# Patient Record
Sex: MALE | Race: OTHER | Employment: OTHER | URBAN - METROPOLITAN AREA
[De-identification: names, ages, dates, MRNs, and addresses within clinical notes are randomized per-mention and may not be internally consistent; named-entity substitution may affect disease eponyms.]

---

## 2023-09-03 ENCOUNTER — APPOINTMENT (OUTPATIENT)
Dept: GENERAL RADIOLOGY | Facility: HOSPITAL | Age: 53
End: 2023-09-03
Attending: EMERGENCY MEDICINE
Payer: MEDICARE

## 2023-09-03 ENCOUNTER — HOSPITAL ENCOUNTER (INPATIENT)
Facility: HOSPITAL | Age: 53
LOS: 4 days | Discharge: NURSING, REHAB OR LONG-TERM CARE | End: 2023-09-07
Attending: EMERGENCY MEDICINE | Admitting: INTERNAL MEDICINE
Payer: MEDICARE

## 2023-09-03 ENCOUNTER — ANESTHESIA (OUTPATIENT)
Dept: SURGERY | Facility: HOSPITAL | Age: 53
End: 2023-09-03
Payer: MEDICARE

## 2023-09-03 ENCOUNTER — HOSPITAL ENCOUNTER (INPATIENT)
Facility: HOSPITAL | Age: 53
LOS: 4 days | Discharge: HOME | End: 2023-09-07
Attending: EMERGENCY MEDICINE | Admitting: INTERNAL MEDICINE
Payer: MEDICARE

## 2023-09-03 ENCOUNTER — ANESTHESIA EVENT (OUTPATIENT)
Dept: SURGERY | Facility: HOSPITAL | Age: 53
End: 2023-09-03
Payer: MEDICARE

## 2023-09-03 DIAGNOSIS — L08.9 LACERATION OF RIGHT HAND WITH INFECTION, INITIAL ENCOUNTER: Primary | ICD-10-CM

## 2023-09-03 DIAGNOSIS — L08.9 INFECTED ABRASION OF FINGER OF RIGHT HAND, INITIAL ENCOUNTER: ICD-10-CM

## 2023-09-03 DIAGNOSIS — L08.9 INFECTED ABRASION OF FINGER OF RIGHT HAND, INITIAL ENCOUNTER: Primary | ICD-10-CM

## 2023-09-03 DIAGNOSIS — S60.419A INFECTED ABRASION OF FINGER OF RIGHT HAND, INITIAL ENCOUNTER: ICD-10-CM

## 2023-09-03 DIAGNOSIS — S61.411A LACERATION OF RIGHT HAND WITH INFECTION, INITIAL ENCOUNTER: Primary | ICD-10-CM

## 2023-09-03 DIAGNOSIS — S60.419A INFECTED ABRASION OF FINGER OF RIGHT HAND, INITIAL ENCOUNTER: Primary | ICD-10-CM

## 2023-09-03 LAB
ALBUMIN SERPL-MCNC: 4 G/DL (ref 3.4–5)
ALBUMIN/GLOB SERPL: 1.3 {RATIO} (ref 1–2)
ALP LIVER SERPL-CCNC: 49 U/L
ALT SERPL-CCNC: 23 U/L
ANION GAP SERPL CALC-SCNC: 5 MMOL/L (ref 0–18)
AST SERPL-CCNC: 22 U/L (ref 15–37)
BASOPHILS # BLD AUTO: 0.04 X10(3) UL (ref 0–0.2)
BASOPHILS NFR BLD AUTO: 0.4 %
BILIRUB SERPL-MCNC: 1.1 MG/DL (ref 0.1–2)
BUN BLD-MCNC: 10 MG/DL (ref 7–18)
CALCIUM BLD-MCNC: 9.1 MG/DL (ref 8.5–10.1)
CHLORIDE SERPL-SCNC: 107 MMOL/L (ref 98–112)
CO2 SERPL-SCNC: 28 MMOL/L (ref 21–32)
CREAT BLD-MCNC: 1.03 MG/DL
EGFRCR SERPLBLD CKD-EPI 2021: 88 ML/MIN/1.73M2 (ref 60–?)
EOSINOPHIL # BLD AUTO: 0.02 X10(3) UL (ref 0–0.7)
EOSINOPHIL NFR BLD AUTO: 0.2 %
ERYTHROCYTE [DISTWIDTH] IN BLOOD BY AUTOMATED COUNT: 13.3 %
GLOBULIN PLAS-MCNC: 3.2 G/DL (ref 2.8–4.4)
GLUCOSE BLD-MCNC: 103 MG/DL (ref 70–99)
HCT VFR BLD AUTO: 41.9 %
HGB BLD-MCNC: 14 G/DL
IMM GRANULOCYTES # BLD AUTO: 0.04 X10(3) UL (ref 0–1)
IMM GRANULOCYTES NFR BLD: 0.4 %
LACTATE SERPL-SCNC: 0.9 MMOL/L (ref 0.4–2)
LYMPHOCYTES # BLD AUTO: 0.86 X10(3) UL (ref 1–4)
LYMPHOCYTES NFR BLD AUTO: 8.1 %
MCH RBC QN AUTO: 28.7 PG (ref 26–34)
MCHC RBC AUTO-ENTMCNC: 33.4 G/DL (ref 31–37)
MCV RBC AUTO: 85.9 FL
MONOCYTES # BLD AUTO: 0.89 X10(3) UL (ref 0.1–1)
MONOCYTES NFR BLD AUTO: 8.3 %
NEUTROPHILS # BLD AUTO: 8.83 X10 (3) UL (ref 1.5–7.7)
NEUTROPHILS # BLD AUTO: 8.83 X10(3) UL (ref 1.5–7.7)
NEUTROPHILS NFR BLD AUTO: 82.6 %
OSMOLALITY SERPL CALC.SUM OF ELEC: 289 MOSM/KG (ref 275–295)
PLATELET # BLD AUTO: 196 10(3)UL (ref 150–450)
POTASSIUM SERPL-SCNC: 3.7 MMOL/L (ref 3.5–5.1)
PROT SERPL-MCNC: 7.2 G/DL (ref 6.4–8.2)
RBC # BLD AUTO: 4.88 X10(6)UL
SODIUM SERPL-SCNC: 140 MMOL/L (ref 136–145)
WBC # BLD AUTO: 10.7 X10(3) UL (ref 4–11)

## 2023-09-03 PROCEDURE — 99223 1ST HOSP IP/OBS HIGH 75: CPT | Performed by: INTERNAL MEDICINE

## 2023-09-03 PROCEDURE — 0L970ZZ DRAINAGE OF RIGHT HAND TENDON, OPEN APPROACH: ICD-10-PCS | Performed by: ORTHOPAEDIC SURGERY

## 2023-09-03 PROCEDURE — 73130 X-RAY EXAM OF HAND: CPT | Performed by: EMERGENCY MEDICINE

## 2023-09-03 RX ORDER — DIPHENHYDRAMINE HYDROCHLORIDE 50 MG/ML
12.5 INJECTION INTRAMUSCULAR; INTRAVENOUS AS NEEDED
Status: CANCELLED | OUTPATIENT
Start: 2023-09-03 | End: 2023-09-04

## 2023-09-03 RX ORDER — ONDANSETRON 2 MG/ML
INJECTION INTRAMUSCULAR; INTRAVENOUS AS NEEDED
Status: DISCONTINUED | OUTPATIENT
Start: 2023-09-03 | End: 2023-09-03 | Stop reason: SURG

## 2023-09-03 RX ORDER — SENNOSIDES 8.6 MG
17.2 TABLET ORAL NIGHTLY PRN
Status: DISCONTINUED | OUTPATIENT
Start: 2023-09-03 | End: 2023-09-07

## 2023-09-03 RX ORDER — CEFAZOLIN SODIUM/WATER 2 G/20 ML
2 SYRINGE (ML) INTRAVENOUS ONCE
Status: COMPLETED | OUTPATIENT
Start: 2023-09-03 | End: 2023-09-03

## 2023-09-03 RX ORDER — ACETAMINOPHEN 500 MG
1000 TABLET ORAL ONCE AS NEEDED
Status: DISCONTINUED | OUTPATIENT
Start: 2023-09-03 | End: 2023-09-03 | Stop reason: HOSPADM

## 2023-09-03 RX ORDER — NALOXONE HYDROCHLORIDE 0.4 MG/ML
80 INJECTION, SOLUTION INTRAMUSCULAR; INTRAVENOUS; SUBCUTANEOUS AS NEEDED
Status: DISCONTINUED | OUTPATIENT
Start: 2023-09-03 | End: 2023-09-03 | Stop reason: HOSPADM

## 2023-09-03 RX ORDER — MORPHINE SULFATE 4 MG/ML
4 INJECTION, SOLUTION INTRAMUSCULAR; INTRAVENOUS EVERY 2 HOUR PRN
Status: DISCONTINUED | OUTPATIENT
Start: 2023-09-03 | End: 2023-09-07

## 2023-09-03 RX ORDER — POLYETHYLENE GLYCOL 3350 17 G/17G
17 POWDER, FOR SOLUTION ORAL DAILY PRN
Status: DISCONTINUED | OUTPATIENT
Start: 2023-09-03 | End: 2023-09-07

## 2023-09-03 RX ORDER — SODIUM CHLORIDE 9 MG/ML
INJECTION, SOLUTION INTRAVENOUS CONTINUOUS
Status: ACTIVE | OUTPATIENT
Start: 2023-09-03 | End: 2023-09-03

## 2023-09-03 RX ORDER — LIDOCAINE HYDROCHLORIDE 10 MG/ML
INJECTION, SOLUTION EPIDURAL; INFILTRATION; INTRACAUDAL; PERINEURAL AS NEEDED
Status: DISCONTINUED | OUTPATIENT
Start: 2023-09-03 | End: 2023-09-03 | Stop reason: SURG

## 2023-09-03 RX ORDER — HYDROCODONE BITARTRATE AND ACETAMINOPHEN 5; 325 MG/1; MG/1
2 TABLET ORAL ONCE AS NEEDED
Status: CANCELLED | OUTPATIENT
Start: 2023-09-03 | End: 2023-09-03

## 2023-09-03 RX ORDER — HYDRALAZINE HYDROCHLORIDE 20 MG/ML
10 INJECTION INTRAMUSCULAR; INTRAVENOUS EVERY 6 HOURS PRN
Status: DISCONTINUED | OUTPATIENT
Start: 2023-09-03 | End: 2023-09-07

## 2023-09-03 RX ORDER — MIDAZOLAM HYDROCHLORIDE 1 MG/ML
1 INJECTION INTRAMUSCULAR; INTRAVENOUS EVERY 5 MIN PRN
Status: CANCELLED | OUTPATIENT
Start: 2023-09-03 | End: 2023-09-04

## 2023-09-03 RX ORDER — CEFAZOLIN SODIUM/WATER 2 G/20 ML
2 SYRINGE (ML) INTRAVENOUS EVERY 8 HOURS
Status: DISCONTINUED | OUTPATIENT
Start: 2023-09-03 | End: 2023-09-07

## 2023-09-03 RX ORDER — HYDROCODONE BITARTRATE AND ACETAMINOPHEN 5; 325 MG/1; MG/1
2 TABLET ORAL ONCE AS NEEDED
Status: DISCONTINUED | OUTPATIENT
Start: 2023-09-03 | End: 2023-09-03 | Stop reason: HOSPADM

## 2023-09-03 RX ORDER — BISACODYL 10 MG
10 SUPPOSITORY, RECTAL RECTAL
Status: DISCONTINUED | OUTPATIENT
Start: 2023-09-03 | End: 2023-09-07

## 2023-09-03 RX ORDER — HYDROMORPHONE HYDROCHLORIDE 1 MG/ML
0.2 INJECTION, SOLUTION INTRAMUSCULAR; INTRAVENOUS; SUBCUTANEOUS EVERY 5 MIN PRN
Status: DISCONTINUED | OUTPATIENT
Start: 2023-09-03 | End: 2023-09-03 | Stop reason: HOSPADM

## 2023-09-03 RX ORDER — SODIUM CHLORIDE, SODIUM LACTATE, POTASSIUM CHLORIDE, CALCIUM CHLORIDE 600; 310; 30; 20 MG/100ML; MG/100ML; MG/100ML; MG/100ML
INJECTION, SOLUTION INTRAVENOUS CONTINUOUS
Status: DISCONTINUED | OUTPATIENT
Start: 2023-09-03 | End: 2023-09-03 | Stop reason: HOSPADM

## 2023-09-03 RX ORDER — HYDROCODONE BITARTRATE AND ACETAMINOPHEN 5; 325 MG/1; MG/1
1 TABLET ORAL ONCE AS NEEDED
Status: CANCELLED | OUTPATIENT
Start: 2023-09-03 | End: 2023-09-03

## 2023-09-03 RX ORDER — HYDROMORPHONE HYDROCHLORIDE 1 MG/ML
0.4 INJECTION, SOLUTION INTRAMUSCULAR; INTRAVENOUS; SUBCUTANEOUS EVERY 5 MIN PRN
Status: DISCONTINUED | OUTPATIENT
Start: 2023-09-03 | End: 2023-09-03 | Stop reason: HOSPADM

## 2023-09-03 RX ORDER — HYDROMORPHONE HYDROCHLORIDE 1 MG/ML
0.4 INJECTION, SOLUTION INTRAMUSCULAR; INTRAVENOUS; SUBCUTANEOUS EVERY 5 MIN PRN
Status: CANCELLED | OUTPATIENT
Start: 2023-09-03 | End: 2023-09-04

## 2023-09-03 RX ORDER — MORPHINE SULFATE 4 MG/ML
4 INJECTION, SOLUTION INTRAMUSCULAR; INTRAVENOUS EVERY 30 MIN PRN
Status: DISCONTINUED | OUTPATIENT
Start: 2023-09-03 | End: 2023-09-03

## 2023-09-03 RX ORDER — HYDROCODONE BITARTRATE AND ACETAMINOPHEN 5; 325 MG/1; MG/1
1 TABLET ORAL ONCE AS NEEDED
Status: DISCONTINUED | OUTPATIENT
Start: 2023-09-03 | End: 2023-09-03 | Stop reason: HOSPADM

## 2023-09-03 RX ORDER — ACETAMINOPHEN 500 MG
1000 TABLET ORAL ONCE
Status: COMPLETED | OUTPATIENT
Start: 2023-09-03 | End: 2023-09-03

## 2023-09-03 RX ORDER — HYDROCODONE BITARTRATE AND ACETAMINOPHEN 5; 325 MG/1; MG/1
1 TABLET ORAL EVERY 4 HOURS PRN
Status: DISCONTINUED | OUTPATIENT
Start: 2023-09-03 | End: 2023-09-07

## 2023-09-03 RX ORDER — DEXAMETHASONE SODIUM PHOSPHATE 4 MG/ML
VIAL (ML) INJECTION AS NEEDED
Status: DISCONTINUED | OUTPATIENT
Start: 2023-09-03 | End: 2023-09-03 | Stop reason: SURG

## 2023-09-03 RX ORDER — BENZONATATE 100 MG/1
200 CAPSULE ORAL 3 TIMES DAILY PRN
Status: DISCONTINUED | OUTPATIENT
Start: 2023-09-03 | End: 2023-09-07

## 2023-09-03 RX ORDER — NALOXONE HYDROCHLORIDE 0.4 MG/ML
80 INJECTION, SOLUTION INTRAMUSCULAR; INTRAVENOUS; SUBCUTANEOUS AS NEEDED
Status: CANCELLED | OUTPATIENT
Start: 2023-09-03 | End: 2023-09-04

## 2023-09-03 RX ORDER — ENOXAPARIN SODIUM 100 MG/ML
40 INJECTION SUBCUTANEOUS NIGHTLY
Status: DISCONTINUED | OUTPATIENT
Start: 2023-09-03 | End: 2023-09-07

## 2023-09-03 RX ORDER — MELATONIN
3 NIGHTLY PRN
Status: DISCONTINUED | OUTPATIENT
Start: 2023-09-03 | End: 2023-09-07

## 2023-09-03 RX ORDER — ONDANSETRON 2 MG/ML
4 INJECTION INTRAMUSCULAR; INTRAVENOUS EVERY 6 HOURS PRN
Status: CANCELLED | OUTPATIENT
Start: 2023-09-03

## 2023-09-03 RX ORDER — MIDAZOLAM HYDROCHLORIDE 1 MG/ML
1 INJECTION INTRAMUSCULAR; INTRAVENOUS EVERY 5 MIN PRN
Status: DISCONTINUED | OUTPATIENT
Start: 2023-09-03 | End: 2023-09-03 | Stop reason: HOSPADM

## 2023-09-03 RX ORDER — ACETAMINOPHEN 325 MG/1
650 TABLET ORAL EVERY 4 HOURS PRN
Status: DISCONTINUED | OUTPATIENT
Start: 2023-09-03 | End: 2023-09-07

## 2023-09-03 RX ORDER — MORPHINE SULFATE 2 MG/ML
2 INJECTION, SOLUTION INTRAMUSCULAR; INTRAVENOUS EVERY 2 HOUR PRN
Status: DISCONTINUED | OUTPATIENT
Start: 2023-09-03 | End: 2023-09-07

## 2023-09-03 RX ORDER — HYDROMORPHONE HYDROCHLORIDE 1 MG/ML
0.2 INJECTION, SOLUTION INTRAMUSCULAR; INTRAVENOUS; SUBCUTANEOUS EVERY 5 MIN PRN
Status: CANCELLED | OUTPATIENT
Start: 2023-09-03 | End: 2023-09-04

## 2023-09-03 RX ORDER — ONDANSETRON 2 MG/ML
4 INJECTION INTRAMUSCULAR; INTRAVENOUS EVERY 6 HOURS PRN
Status: DISCONTINUED | OUTPATIENT
Start: 2023-09-03 | End: 2023-09-07

## 2023-09-03 RX ORDER — ONDANSETRON 2 MG/ML
4 INJECTION INTRAMUSCULAR; INTRAVENOUS EVERY 6 HOURS PRN
Status: DISCONTINUED | OUTPATIENT
Start: 2023-09-03 | End: 2023-09-03 | Stop reason: HOSPADM

## 2023-09-03 RX ORDER — ECHINACEA PURPUREA EXTRACT 125 MG
1 TABLET ORAL
Status: DISCONTINUED | OUTPATIENT
Start: 2023-09-03 | End: 2023-09-07

## 2023-09-03 RX ORDER — PROCHLORPERAZINE EDISYLATE 5 MG/ML
5 INJECTION INTRAMUSCULAR; INTRAVENOUS EVERY 8 HOURS PRN
Status: DISCONTINUED | OUTPATIENT
Start: 2023-09-03 | End: 2023-09-07

## 2023-09-03 RX ORDER — MORPHINE SULFATE 2 MG/ML
1 INJECTION, SOLUTION INTRAMUSCULAR; INTRAVENOUS EVERY 2 HOUR PRN
Status: DISCONTINUED | OUTPATIENT
Start: 2023-09-03 | End: 2023-09-07

## 2023-09-03 RX ORDER — HYDROMORPHONE HYDROCHLORIDE 1 MG/ML
0.6 INJECTION, SOLUTION INTRAMUSCULAR; INTRAVENOUS; SUBCUTANEOUS EVERY 5 MIN PRN
Status: CANCELLED | OUTPATIENT
Start: 2023-09-03 | End: 2023-09-04

## 2023-09-03 RX ORDER — SODIUM CHLORIDE, SODIUM LACTATE, POTASSIUM CHLORIDE, CALCIUM CHLORIDE 600; 310; 30; 20 MG/100ML; MG/100ML; MG/100ML; MG/100ML
INJECTION, SOLUTION INTRAVENOUS CONTINUOUS
Status: CANCELLED | OUTPATIENT
Start: 2023-09-03

## 2023-09-03 RX ORDER — HYDROCODONE BITARTRATE AND ACETAMINOPHEN 5; 325 MG/1; MG/1
2 TABLET ORAL EVERY 4 HOURS PRN
Status: DISCONTINUED | OUTPATIENT
Start: 2023-09-03 | End: 2023-09-07

## 2023-09-03 RX ORDER — HYDROMORPHONE HYDROCHLORIDE 1 MG/ML
0.6 INJECTION, SOLUTION INTRAMUSCULAR; INTRAVENOUS; SUBCUTANEOUS EVERY 5 MIN PRN
Status: DISCONTINUED | OUTPATIENT
Start: 2023-09-03 | End: 2023-09-03 | Stop reason: HOSPADM

## 2023-09-03 RX ORDER — ENEMA 19; 7 G/133ML; G/133ML
1 ENEMA RECTAL ONCE AS NEEDED
Status: DISCONTINUED | OUTPATIENT
Start: 2023-09-03 | End: 2023-09-07

## 2023-09-03 RX ORDER — MEPERIDINE HYDROCHLORIDE 25 MG/ML
12.5 INJECTION INTRAMUSCULAR; INTRAVENOUS; SUBCUTANEOUS AS NEEDED
Status: DISCONTINUED | OUTPATIENT
Start: 2023-09-03 | End: 2023-09-03 | Stop reason: HOSPADM

## 2023-09-03 RX ORDER — ACETAMINOPHEN 500 MG
1000 TABLET ORAL ONCE AS NEEDED
Status: CANCELLED | OUTPATIENT
Start: 2023-09-03 | End: 2023-09-03

## 2023-09-03 RX ORDER — DIPHENHYDRAMINE HYDROCHLORIDE 50 MG/ML
12.5 INJECTION INTRAMUSCULAR; INTRAVENOUS AS NEEDED
Status: DISCONTINUED | OUTPATIENT
Start: 2023-09-03 | End: 2023-09-03 | Stop reason: HOSPADM

## 2023-09-03 RX ORDER — MEPERIDINE HYDROCHLORIDE 25 MG/ML
12.5 INJECTION INTRAMUSCULAR; INTRAVENOUS; SUBCUTANEOUS AS NEEDED
Status: CANCELLED | OUTPATIENT
Start: 2023-09-03

## 2023-09-03 RX ADMIN — ONDANSETRON 4 MG: 2 INJECTION INTRAMUSCULAR; INTRAVENOUS at 19:54:00

## 2023-09-03 RX ADMIN — DEXAMETHASONE SODIUM PHOSPHATE 4 MG: 4 MG/ML VIAL (ML) INJECTION at 19:54:00

## 2023-09-03 RX ADMIN — LIDOCAINE HYDROCHLORIDE 50 MG: 10 INJECTION, SOLUTION EPIDURAL; INFILTRATION; INTRACAUDAL; PERINEURAL at 19:48:00

## 2023-09-03 RX ADMIN — CEFAZOLIN SODIUM/WATER 2 G: 2 G/20 ML SYRINGE (ML) INTRAVENOUS at 19:54:00

## 2023-09-03 NOTE — CONSULTS
[address]    PATIENT'S NAME: Kristen Linton   ATTENDING PHYSICIAN: Latha Lux DO   CONSULTING PHYSICIAN: Liana Hdez M.D. PATIENT ACCOUNT#:   [de-identified]    LOCATION:  [address] [place] [address]  MEDICAL RECORD #:   [id number]       YOB: 1970  ADMISSION DATE:       09/03/2023      CONSULT DATE:  09/03/2023    REPORT OF CONSULTATION    REASON FOR CONSULTATION:  From Latha Morillo DO, regarding right hand laceration. HISTORY OF PRESENT ILLNESS:  This is a 53-year-old male who presents with increasing pain and swelling at the right hand, primarily index finger. He lacerated the palmar base of his index finger while cleaning a knife 2 days ago. He was not evaluated at that time. He now presents with worsening pain and inability to flex and extend the finger. He denies numbness or tingling and no other complaints. PAST MEDICAL HISTORY:  Hypertension, [place] disease. MEDICATIONS:  See list in the chart. ALLERGIES:  None. FAMILY HISTORY:  Charted and reviewed. SOCIAL HISTORY:  Charted and reviewed. REVIEW OF SYSTEMS:  Charted and reviewed. PHYSICAL EXAMINATION:    GENERAL:  Alert male resting in bed, in no acute distress. VITAL SIGNS:  Temp at the time of admission was 100.7. EXTREMITY:  Exam of the right upper extremity:  There is a transverse laceration at the palmar base of the index finger in line with the crease with dried blood and no active drainage. There is diffuse swelling throughout the index finger primarily but also the radial aspect of the hand dorsally and palmarly with erythema throughout the palmar region. Passive range of motion is with significant pain to attempted flexion and passive extension. Tenderness to palpation along the palmar aspect of the index finger and the radial palm without fluctuance. Neurovascularly intact at the index finger.       IMAGING:  Three-view x-rays of the right hand are unremarkable at the area of the index finger and palm. IMPRESSION:  Right index finger laceration, rule out flexor tenosynovitis. PLAN:  I discussed the possible diagnosis with the patient. IV antibiotics are started. I discussed the importance of strict elevation of the hand to help the swelling. I will discuss the patient with my partners who specialize in hand surgery to plan for further evaluation and treatment. The patient will be n.p.o. until I communicate with my partners. Thank you for the consultation.     Dictated By Sangeeta Perez M.D.  d: 09/03/2023 09:58:23  t: 09/03/2023 10:33:37  [facility] [id number]  /

## 2023-09-03 NOTE — ED QUICK NOTES
Orders for admission, patient is aware of plan and ready to go upstairs. Any questions, please call ED RN Alla Mcnamara  at extension [phone]. Vaccinated?    Type of COVID test sent:  COVID Suspicion level: None      Titratable drug(s) infusing: N/A  Rate: N/A    LOC at time of transport: A/Ox4    Other pertinent information: N/A     CIWA score= N/A  NIH score= N/A

## 2023-09-03 NOTE — PROGRESS NOTES
Ortho hand    Pt seen and examined. The finger does have signs of flexor tenosynovitis. Recommend surgery for incision and drainage. Risk/benefit d/w patient inc stiffness, pain, chronic infection. He shows good understanding and wishes to proceed. To OR tonight.     Enoch Amin MD  [address] [place] [address] Rd

## 2023-09-03 NOTE — ED INITIAL ASSESSMENT (HPI)
Pt states he cut his right hand with a kitchen knife Friday afternoon. Today pt noticed redness and swelling to hand. Pt c/o pain to area.

## 2023-09-04 PROCEDURE — 99232 SBSQ HOSP IP/OBS MODERATE 35: CPT | Performed by: INTERNAL MEDICINE

## 2023-09-04 RX ORDER — AMLODIPINE BESYLATE 5 MG/1
5 TABLET ORAL DAILY
Status: DISCONTINUED | OUTPATIENT
Start: 2023-09-04 | End: 2023-09-07

## 2023-09-04 NOTE — OPERATIVE REPORT
[address]    PATIENT'S NAME: Denise Morse   ATTENDING PHYSICIAN: Latha Gallardo DO   OPERATING PHYSICIAN: Negin Cruz M.D. PATIENT ACCOUNT#:   [de-identified]    LOCATION:  PACU [facility] PACU 2 [facility] 10  MEDICAL RECORD #:   [id number]       YOB: 1970  ADMISSION DATE:       09/03/2023      OPERATION DATE:  09/03/2023    OPERATIVE REPORT      PREOPERATIVE DIAGNOSIS:  Right index finger septic flexor tenosynovitis. POSTOPERATIVE DIAGNOSIS:  Right index finger septic flexor tenosynovitis. PROCEDURE:  Incision and drainage of right index finger flexor tendon sheath. ANESTHESIA:  General.    ESTIMATED BLOOD LOSS:  Minimal.    TOURNIQUET TIME:  25 minutes. SPECIMENS:  Tissue for culture and sensitivity. COMPLICATIONS:  None    DISPOSITION:  Fair condition to the recovery room. PLAN:  Patient will have packing removed in 36 to 48 hours and will be on antibiotics. We will await the final culture results. INDICATIONS:  The patient is a 53-year-old gentleman who had sustained a laceration to his right index finger while cleaning a knife. Over the course of the last few days, he has had swelling which has worsened and difficulty with motion of the finger. He presented to the emergency room earlier today and had signs of septic flexor tenosynovitis. He was therefore offered surgical intervention. The risks and benefits of the procedure were discussed in detail with the patient including risk of stiffness, chronic pain, chronic infection, and further surgery. He shows good understanding of these issues and wished to surgery     FINDINGS:  Patient with pus throughout the tendon sheath. OPERATIVE TECHNIQUE:  On the date of operation, I saw the patient in the holding room, initialed the surgical site. The patient was taken to the operating room and was placed in supine position on the OR table. General endotracheal anesthesia was smoothly initiated.   The right upper extremity was prepped and draped in standard surgical fashion after tourniquet placed about the right biceps. The limb was elevated for 3 minutes, and the tourniquet was inflated to 250 mmHg. A surgical time-out was taken to confirm patient, surgical site and procedure were verified. The incision in the distal palmar crease in line with the index finger was performed. Dissection was carried down through soft tissue and full-thickness skin flaps were raised. The flexor tendon sheath was visualized. There was swelling around this and the A1 pulley was then partially released. Pus was noted at this level as well as some pus surrounding the tendon sheath. The laceration at the base of the index finger was explored, and there was pus at this level as well. Incision in the volar crease at the DIP joint was opened and the distal end of the tendon sheath was visualized and this was then opened and pus was present here. A last incision was performed over the volar aspect of the PIP joint and once again dissection was carried down through soft tissue to the tendon sheath. A small amount of pus was noted at this level. The tendon sheath was then copiously irrigated using 3 L of normal saline. A blunt-tip needle was then threaded through the tendon sheath to irrigate the tendon sheath and care was taken to ensure that the saline fluid was run between the incisions. After 3 L of normal saline, the fluid was clear. The wounds were then packed with iodoform gauze. Sterile bulky dressings were applied. The tourniquet was released and the fingers pinked up nicely. The patient was awakened from anesthesia and was taken to the recovery in fair condition. He will be admitted for postoperative observation. Packing will be removed in 36 to 48 hours.        Dictated By Darien Siemens, M.D.  d: 09/03/2023 20:39:04  t: 09/03/2023 21:36:03  [facility] [id number]  [place]/

## 2023-09-04 NOTE — PHYSICAL THERAPY NOTE
PHYSICAL THERAPY EVALUATION - INPATIENT     Room Number: 353/353-A  Evaluation Date: 9/4/2023  Type of Evaluation: Initial  Physician Order: PT Eval and Treat    Presenting Problem: laceration of R hand with infection  Co-Morbidities : HTN, Coos's Disease  Reason for Therapy: Mobility Dysfunction and Discharge Planning    Operative report 9/3/23:  PROCEDURE:  Incision and drainage of right index finger flexor tendon sheath. ASSESSMENT   PT orders received, chart reviewed, and RN approved PT session. Pt is a 50year old male admitted on 9/3/2023 for laceration of R hand with infection. Functional outcome measures completed include [facility]. The AM-PAC '6-Clicks' Inpatient Basic Mobility Short Form was completed and this patient is demonstrating a Approx Degree of Impairment: 0%  degree of impairment in mobility. Research supports that patients with this level of impairment may benefit from DC recommendation to home, which is a hotel. PT Discharge Recommendations: Home (back to hotel)      PLAN  Patient has been evaluated and presents with no skilled Physical Therapy needs at this time. Patient discharged from Physical Therapy services. Please re-order if a new functional limitation presents during this admission. GOALS  Patient was able to achieve the following goals . .. Patient was able to transfer At previous, functional level  Supervision here in hospital, but is baseline for pt   Patient able to ambulate on level surfaces At previous, functional level  Supervision here in hospital, but is baseline for pt         HOME SITUATION  Type of Home: Other (Comment) (hotel)   Home Layout: One level;Elevator                Lives With: Alone  Drives: Yes          Prior Level of [place]: initial start of PT session, pt informed rehab team that pt lives in a second floor apartment, with stairs to enter and stairs inside the apartment. Pt lives alone, has 2 pit bulls that he walks everyday.  Pt reports that he walks 2-3 miles per day and he drives. Pt recently , \"their mom abandoned them when they were 27. \" The two older girls live in [place] and work for [employer]. Pt's son also has carito's disease \"he is worse than me\" and lives with the ex wife. But will be moving with pt soon. At end of session, pt reports that he lost the apartment a few months ago, was staying with cousin, then recently has been staying in a [employer] in [place]. Pt reports \"I get $1500 a month for disability, I was laid off as a  over a year ago. \"    SUBJECTIVE  Pt pleasant and cooperative.        OBJECTIVE  Precautions: Bed/chair alarm  Fall Risk: High fall risk    WEIGHT BEARING RESTRICTION  Weight Bearing Restriction: R upper extremity  R Upper Extremity: Non-Weight Bearing             PAIN ASSESSMENT  Ratin  Location: R hand  Management Techniques: Repositioning    COGNITION  Attention Span:  attends with cues to redirect and dec insight to disability  Following Commands:  follows one step commands with increased time, follows one step commands with repetition, follows multistep commands with increased time, and follows multistep commands with repetition  Safety Judgement:  decreased awareness of need for assistance and decreased awareness of need for safety  Awareness of Errors:  assistance required to identify errors made, assistance required to correct errors made, and decreased awareness of errors   Awareness of Deficits:  decreased awareness of deficits  Problem Solving:  assistance required to identify errors made    RANGE OF MOTION AND STRENGTH ASSESSMENT  Upper extremity ROM and strength are within functional limits See OT evaluation for details    Lower extremity ROM is within functional limits     Lower extremity strength is within functional limits       BALANCE  Static Sitting: Fair +  Dynamic Sitting: Fair +  Static Standing: Fair -  Dynamic Standing: Poor +    ADDITIONAL TESTS ACTIVITY TOLERANCE  Pulse: 71  Heart Rate Source: Monitor     BP: (!) 155/99  BP Location: Right arm  BP Method: Automatic  Patient Position: Sitting    O2 WALK  Oxygen Therapy  SPO2% on Room Air at Rest: 100    NEUROLOGICAL FINDINGS                        AM-PAC '6-Clicks' INPATIENT SHORT FORM - BASIC MOBILITY  How much difficulty does the patient currently have. .. Patient Difficulty: Turning over in bed (including adjusting bedclothes, sheets and blankets)?: None   Patient Difficulty: Sitting down on and standing up from a chair with arms (e.g., wheelchair, bedside commode, etc.): None   Patient Difficulty: Moving from lying on back to sitting on the side of the bed?: None   How much help from another person does the patient currently need. .. Help from Another: Moving to and from a bed to a chair (including a wheelchair)?: None   Help from Another: Need to walk in hospital room?: None   Help from Another: Climbing 3-5 steps with a railing?: None       AM-PAC Score:  Raw Score: 24   Approx Degree of Impairment: 0%   Standardized Score (AM-PAC Scale): 61.14   CMS Modifier (G-Code): CH    FUNCTIONAL ABILITY STATUS  Gait Assessment   Functional Mobility/Gait Assessment  Gait Assistance: Supervision  Distance (ft): 200  Assistive Device: None  Pattern: Ataxic  Stairs: Stairs  How Many Stairs: 8  Device: 1 Rail  Assist: Supervision  Pattern: Ascend and Descend  Ascend and Descend : Step to    Skilled Therapy Provided     Bed Mobility:  Rolling: NT  Supine to sit: Mod I   Sit to supine: NT     Transfer Mobility:  Sit to stand: supervision   Stand to sit: supervision  Gait = supervision    Therapist's comments: Pt in bed and agreed to PT. Pt Mod I for bed mobility, without use of R hand for assistance. Pt demonstrates chorea throughout session, greater in sitting position. Supervision for transfers, ambulation, and stairs.  Pt instructed to dec speed with movement transitions and ambulation, to allow for safer movement. Pt instructed in step to gait on the stairs with use of L UE on the railing. Pt cued to dec speed with stairs, and keep focus. Pt returned to room, left up in chair with all needs in reach. Inc time spent on education and discussion with pt regarding the importance of safety awareness, fall precautions, activity level and pacing. Also discussed the pt needs supervision with activities at home, and recommend that pt stays with his daughters, so they can provide initial supervision upon return to home. Pt in understanding, and RN notified. Exercise/Education Provided: Body mechanics  Functional activity tolerated  Gait training  Transfer training    Patient End of Session: Up in chair;Needs met;Call light within reach;RN aware of session/findings; All patient questions and concerns addressed; Alarm set    Patient Evaluation Complexity Level:  History Moderate - 1 or 2 personal factors and/or co-morbidities   Examination of body systems Low - addressing 1-2 elements   Clinical Presentation Low - Stable   Clinical Decision Making Low Complexity       PT Session Time: 30 minutes  Gait Trainin  minutes  Therapeutic Activity: 10 minutes  Neuromuscular Re-education: 0 minutes  Therapeutic Exercise: 0 minutes

## 2023-09-04 NOTE — BRIEF OP NOTE
Pre-Operative Diagnosis: Infected abrasion of finger of right hand, initial encounter [S60.419A, L08.9]     Post-Operative Diagnosis: Infected abrasion of finger of right hand, initial encounter [S60.419A, L08.9]      Procedure Performed:   IRRIGATION & DEBRIDEMENT FINGERS/HAND - RIGHT    Surgeon(s) and Role:     Franck Archer MD - Primary    Assistant(s):        Surgical Findings: pus noted in the flexor tendon sheath     Specimen: tissue for C&S     Estimated Blood Loss: Blood Output: 10 mL (9/3/2023  8:29 PM)    Plan:  Packing to be removed 9/5    Dictation Number:       Qiana Walton MD  9/3/2023  8:40 PM

## 2023-09-05 LAB
ANION GAP SERPL CALC-SCNC: 4 MMOL/L (ref 0–18)
BASOPHILS # BLD AUTO: 0.03 X10(3) UL (ref 0–0.2)
BASOPHILS NFR BLD AUTO: 0.5 %
BUN BLD-MCNC: 11 MG/DL (ref 7–18)
CALCIUM BLD-MCNC: 8.9 MG/DL (ref 8.5–10.1)
CHLORIDE SERPL-SCNC: 103 MMOL/L (ref 98–112)
CO2 SERPL-SCNC: 29 MMOL/L (ref 21–32)
CREAT BLD-MCNC: 0.98 MG/DL
EGFRCR SERPLBLD CKD-EPI 2021: 94 ML/MIN/1.73M2 (ref 60–?)
EOSINOPHIL # BLD AUTO: 0.08 X10(3) UL (ref 0–0.7)
EOSINOPHIL NFR BLD AUTO: 1.3 %
ERYTHROCYTE [DISTWIDTH] IN BLOOD BY AUTOMATED COUNT: 12.9 %
GLUCOSE BLD-MCNC: 90 MG/DL (ref 70–99)
HCT VFR BLD AUTO: 42.1 %
HGB BLD-MCNC: 13.6 G/DL
IMM GRANULOCYTES # BLD AUTO: 0.01 X10(3) UL (ref 0–1)
IMM GRANULOCYTES NFR BLD: 0.2 %
LYMPHOCYTES # BLD AUTO: 1.28 X10(3) UL (ref 1–4)
LYMPHOCYTES NFR BLD AUTO: 20.9 %
MCH RBC QN AUTO: 28.6 PG (ref 26–34)
MCHC RBC AUTO-ENTMCNC: 32.3 G/DL (ref 31–37)
MCV RBC AUTO: 88.4 FL
MONOCYTES # BLD AUTO: 0.5 X10(3) UL (ref 0.1–1)
MONOCYTES NFR BLD AUTO: 8.2 %
NEUTROPHILS # BLD AUTO: 4.23 X10 (3) UL (ref 1.5–7.7)
NEUTROPHILS # BLD AUTO: 4.23 X10(3) UL (ref 1.5–7.7)
NEUTROPHILS NFR BLD AUTO: 68.9 %
OSMOLALITY SERPL CALC.SUM OF ELEC: 281 MOSM/KG (ref 275–295)
PLATELET # BLD AUTO: 206 10(3)UL (ref 150–450)
POTASSIUM SERPL-SCNC: 3.8 MMOL/L (ref 3.5–5.1)
RBC # BLD AUTO: 4.76 X10(6)UL
SODIUM SERPL-SCNC: 136 MMOL/L (ref 136–145)
WBC # BLD AUTO: 6.1 X10(3) UL (ref 4–11)

## 2023-09-05 PROCEDURE — 99232 SBSQ HOSP IP/OBS MODERATE 35: CPT | Performed by: INTERNAL MEDICINE

## 2023-09-05 NOTE — PLAN OF CARE
Patient A&Ox4 , vital signs stable . Denies pain now . Dressing to right hand clean and dry ,hand elevated on pillows . Able to wiggle fingers . Voiding in bathroom . On iv antibiotics . Safety precautions in place . Reminded to \"call don't fall\" . Home when cleared .

## 2023-09-05 NOTE — CM/SW NOTE
CM met with pt at bedside to discuss discharge planning. Pt reports that he has been living alone for the past 10 years. He previously owned a home, but lost it during his divorce. He has recently been living in hotels - most recently the [employer] in [place]. He had been planning to move to the [place] in [place]. Pt states that he is able to drive. He receives income monthly due to being on disability and reports that he has been paying his bills on time. Pt reports that he previously had an [address] through the [facility], however it was recently terminated. He was told he will have to present to an office to be issued a new one. He denies having a PCP, but had been looking into establishing one. Informed pt that he should have an assigned PCP on his insurance card, but will place additional resources on AVS for follow up care. Pt denies any further discharge needs at this time, as he does have two daughters living in the area. Per PT eval:  HOME SITUATION  Type of Home: Other (Comment) (hotel)   Home Layout: One level;Elevator  Lives With: Alone  Drives: Yes  Prior Level of [place]: initial start of PT session, pt informed rehab team that pt lives in a second floor apartment, with stairs to enter and stairs inside the apartment. Pt lives alone, has 2 pit bulls that he walks everyday. Pt reports that he walks 2-3 miles per day and he drives. Pt recently , \"their mom abandoned them when they were 27. \" The two older girls live in [place] and work for [employer]. Pt's son also has carito's disease \"he is worse than me\" and lives with the ex wife. But will be moving with pt soon. At end of session, pt reports that he lost the apartment a few months ago, was staying with cousin, then [facility] [place]. Pt reports \"I get $1500 a month for disability, I was laid off as a  over a year ago. \"    Discussed ID's recommendations for discharge with IV abx and his potential options: teach/train, home infusion, in office/infusion center, SNF. Pt states that he feels that he would be capable of learning/administering IV abx himself and that his dtr Raymon Tse) lives in [place] and would also be able to assist. Discussed that an address will need to be confirmed if he plans to use home infusion or teach/train for the infusion company to ensure they can service his home address. Additionally, discussed that his options will depend on the type of drug/frequency and that his compliance is of utmost importance. Pt states that he will talk with his dtr and look into securing a hotel this evening. He had been waiting until the prices went down after the holiday weekend. CM/SW to follow up with pt tomorrow to further discuss discharge planning once IV abx plan is known. Will wait until address is confirmed to start infusion/HH referral in [employer]. Final IV abx order and line placement is pending at this time.     TERESA Goldman, RN-BC    [id number]

## 2023-09-05 NOTE — PLAN OF CARE
Patient A&Ox4, VSS on room air. POD#2, dressing to R hand C/D/I, elevated on pillows. Patient reporting mild pain to R hand, controlled with current regimen. Patient ambulating well, steady gait. Tolerating regular diet, voiding freely. Plan for ID to see patient. Continue current IV ABX per orders. POC discussed with patient.

## 2023-09-06 PROCEDURE — 05H633Z INSERTION OF INFUSION DEVICE INTO LEFT SUBCLAVIAN VEIN, PERCUTANEOUS APPROACH: ICD-10-PCS | Performed by: INTERNAL MEDICINE

## 2023-09-06 PROCEDURE — 99232 SBSQ HOSP IP/OBS MODERATE 35: CPT | Performed by: INTERNAL MEDICINE

## 2023-09-06 PROCEDURE — B547ZZA ULTRASONOGRAPHY OF LEFT SUBCLAVIAN VEIN, GUIDANCE: ICD-10-PCS | Performed by: INTERNAL MEDICINE

## 2023-09-06 RX ORDER — AMLODIPINE BESYLATE 5 MG/1
5 TABLET ORAL DAILY
Qty: 90 TABLET | Refills: 1 | Status: SHIPPED | OUTPATIENT
Start: 2023-09-07 | End: 2023-09-07

## 2023-09-06 RX ORDER — CEFAZOLIN SODIUM/WATER 2 G/20 ML
2 SYRINGE (ML) INTRAVENOUS EVERY 8 HOURS
Qty: 1200 ML | Refills: 0 | Status: SHIPPED | OUTPATIENT
Start: 2023-09-07 | End: 2023-09-07

## 2023-09-06 NOTE — OCCUPATIONAL THERAPY NOTE
Duplicate OT order received, chart reviewed, pt with no change in status per chart and with rec from OT for home. OT will sign off as no skilled needs.

## 2023-09-06 NOTE — DISCHARGE INSTRUCTIONS
Appointment Thursday 9/7 at 1pm at [facility] for infusion of Dalbav[address]in. [address]  [phone]- will need to go for a total of 3 weekly infusions  Per bonita Kaufman for infusion clinic  to remove PICC line AFTER infusion on 9/07. Wound Care Instructions to left hand:  Soapy water soaks and dry gauze dressing changes         General Medical Follow up:  [facility] [employer] (healthcare clinic) [email or web address]  [facility] welcomes patients with Medicaid, Medicare, private insurance or no insurance at all, possibly at a discounted rate. [facility] offers low cost prescriptions drugs when seen by a [facility] physician. [address] [address], [place], [address] [place] [phone]  [facility] [address], [place] [phone]  [facility] [address] [facility] [facility], [address] [place] [address] [phone] [facility] [address] [facility] [facility] [address] [facility] [address], [address]  [place] [address] [facility] [address] [phone] o al [phone].           /    [facility] [place] [address]  Jasson Mendez   [phone]  [email or web address]    [address] [facility] housing for individuals, families & veterans who have been homeless and meet eligibility. Intake hours are Monday - Friday from 8am-8pm, Saturday (and holidays) from [address] [place] are seen at our offices at [address] [place]. If you cannot make it to our location, please call [phone], option 1.         [address] [facility] [address] of [address] [facility] [address]  # [phone]      To establish care with a primary care physician or specialist, please call the [place] [address] Physician Referral line at [phone] or visit [email or web address]

## 2023-09-06 NOTE — CM/SW NOTE
Sw met with pt and provided him with [facility] list.  He states that his daughter can come over daily to do the IV abs. Will need to find out from [facility] is the q 8 hour medication can be done through a pump that is filled once daily. Paige Pena from [facility] checking.     Danny Acharya LCSW  /Discharge Planner

## 2023-09-06 NOTE — CM/SW NOTE
Elier spoke to daughter Lucio Mcnamara- she states she did not agree to assist with IV abs. ELIER updated pt. In the mean time, pt's daughter Baylee Hicsk came to hospital. It would be a hardship for her but she could work out helping once per day. Current IV plans was 3 x per day . [facility] offered to do training here tomorrow am to see if pt could manage IV push. Elier spoke to Dr. Ancelmo Lopez this afternoon. He has changed pt to once weekly infusion at the [facility] office. Elier spoke to Elisa at [facility]- she confirmed coverage of the once weekly dose of dalbavancin. Plan is to dc tomorrow- daughter Baylee Hicks will  pt at 1215pm and bring him to [address] Infusion office at  [address] [facility]  [phone]. Pt and daughter aware of plans and in agreement.     Rubio Monson LCSW  /Discharge Planner

## 2023-09-06 NOTE — PLAN OF CARE
A/o x4. RA/. Declines SCD's/ankle pumps encouraged. Regular diet. LBM 9/5. Voiding. IV abx infusing. Daily warm soaks for R hand. Dsg in place cdi. Up independent. Plan waiting for final cultures. POC updated with pt. All safety measures in place. Call light within reach instructed pt to call for help or assistance.

## 2023-09-06 NOTE — PROGRESS NOTES
Alert and oriented x 4. Right hand with ace wrap dressing in place, clean and dry. ID cleared patient for discharge with IV antibiotic. PICC line placed today.  for discharge planning.

## 2023-09-06 NOTE — CM/SW NOTE
ELIER met with pt this am.  DIscussed need for IV abs at home. Due to his r arm being bandage and his Bain[place]'s Disease- he will not be able to manage IV abs at home. He states he sold his car so would not be able to gt to OP IV bas on a daily basis ( Dr Srinivasan Leon was willing to switch him to once per day dose)  Discussed [facility] as an option. SW questioned pt about being on SSD and whether he had Mediicare. Pt states he has Medicare Part A and Part B. He states his card is at the [facility]. ELIER sent email to IVD and called [facility]. Confirmed that pt does have Medicare Part A and B -  Policy number is [id number]. Elier sent updates to [facility] referral with Medicare number. Await responses. Will complete PASSR.     Miki Landau, LCSW  /Discharge Planner

## 2023-09-06 NOTE — CM/SW NOTE
ID note acknowledged. Pt will require IV Cefazolin q8h at discharge. Referrals sent for infusion services, HH, and SNF via [facility]. CM/SW to follow up with pt to confirm discharge plan, as he was planning to speak with his dtr Blas Bence) last night to determine whether she can assist him with IV abx. If plans remain for discharge home with support of dtr, will need to confirm address and provide to infusion company/HH agency. Pt also will need daily warm, soapy water soaks and dry gauze dressing changes per ortho. CM/SW to follow up with pt to confirm safe discharge plan to ensure his compliance with recommendations. Addendum: Call received from [facility] stating that they are still running pt's insurance benefits for IV abx. She was instructed that the pt has Medicare as primary and therefore he would only be eligible for in office infusion. Discussed that pt currently has orders for Cefaozolin q8h, but may be eligible for daily option. Provided her with pt's Medicare information ([id number]). Will update unit SW.     Cheng Bernardo, WALEN, RN-BC    [id number]

## 2023-09-06 NOTE — PHYSICAL THERAPY NOTE
Duplicate PT order received, chart reviewed. Pt was evaluated on 9/4 with recs for home. Will again dc as no skilled PT needs noted.

## 2023-09-07 VITALS
TEMPERATURE: 98 F | HEIGHT: 72 IN | OXYGEN SATURATION: 97 % | WEIGHT: 160 LBS | DIASTOLIC BLOOD PRESSURE: 105 MMHG | HEART RATE: 75 BPM | SYSTOLIC BLOOD PRESSURE: 148 MMHG | BODY MASS INDEX: 21.67 KG/M2 | RESPIRATION RATE: 17 BRPM

## 2023-09-07 PROCEDURE — 99239 HOSP IP/OBS DSCHRG MGMT >30: CPT | Performed by: INTERNAL MEDICINE

## 2023-09-07 RX ORDER — AMLODIPINE BESYLATE 5 MG/1
5 TABLET ORAL ONCE
Status: COMPLETED | OUTPATIENT
Start: 2023-09-07 | End: 2023-09-07

## 2023-09-07 RX ORDER — HYDROCODONE BITARTRATE AND ACETAMINOPHEN 5; 325 MG/1; MG/1
1 TABLET ORAL EVERY 4 HOURS PRN
Qty: 20 TABLET | Refills: 0 | Status: SHIPPED | OUTPATIENT
Start: 2023-09-07

## 2023-09-07 RX ORDER — AMLODIPINE BESYLATE 5 MG/1
10 TABLET ORAL DAILY
Status: DISCONTINUED | OUTPATIENT
Start: 2023-09-08 | End: 2023-09-07

## 2023-09-07 RX ORDER — AMLODIPINE BESYLATE 10 MG/1
10 TABLET ORAL DAILY
Qty: 90 TABLET | Refills: 0 | Status: SHIPPED | OUTPATIENT
Start: 2023-09-07

## 2023-09-07 NOTE — PROGRESS NOTES
Discharge paperwork reviewed with patient. Reviewed instructions, restrictions, medications and follow up appointments. Patients daughter to pick him up today and take him to infusion center for apt at 13:00.

## 2023-09-07 NOTE — PLAN OF CARE
Patient A&Ox4 . Blood pressure slightly elevated ,on room air . Pain controlled with norco . Up ad sandra in room. Dressing to right hand clean and dry  soap water soaks daily , able to wiggle fingers ,good CMS . On iv ancef . Midline intact to left upper arm . Plan for home tomorrow with iv antibiotic dalbavancin weekly at [facility].  Reminded to \"call don't fall'

## 2023-09-07 NOTE — CDS QUERY
Chayito Bansal  Dear Dr. Angel Richards:   Clinical information (provided below) contains conflicting documentation of debridement versus drainage. PLEASE CLARIFY THE TYPE OF PROCEDURE:     Clarification #1: TYPE of Procedure  (   )  Excisional Debridement (surgical removal or cutting away of devitalized tissue ,necrosis, or slough to the level of viable tissue)   (   )  Non Excisional Debridement  (non-operative scraping, brushing, irrigating, scrubbing, or washing of devitalized tissue, necrosis, or slough; minor removal of loose fragments)  (   x)  Incision & Drainage only, without Debridement      IF DEBRIDEMENT IS SELECTED THEN COMPLETE THE FOLLOWING QUESTIONS BELOW:  Clarification #2:    Instrument(s) used for debridement: _____________________________________    Clarification #3:                  Appearance of the tissue removed (e.g. necrotic, devitalized, non-viable):  ________________    Clarification  4:                  Size of the wound:  __________________                                                                  Documentation from the Medical Record:   9/3 brief op report:  Pre-Operative Diagnosis: Infected abrasion of finger of right hand, initial encounter [S60.419A, L08.9]  Post-Operative Diagnosis: Infected abrasion of finger of right hand, initial encounter [S60.419A, L08.9]   Procedure Performed: IRRIGATION & DEBRIDEMENT FINGERS/HAND - RIGHT    9/3 operative report:   PREOPERATIVE DIAGNOSIS:  Right index finger septic flexor tenosynovitis. POSTOPERATIVE DIAGNOSIS:  Right index finger septic flexor tenosynovitis. PROCEDURE:  Incision and drainage of right index finger flexor tendon sheath. OPERATIVE TECHNIQUE: On the date of operation, I saw the patient in the holding room, initialed the surgical site. The patient was taken to the operating room and was placed in supine position on the OR table. General endotracheal anesthesia was smoothly initiated.  The right upper extremity was prepped and draped in standard surgical fashion after tourniquet placed about the right biceps. The limb was elevated for 3 minutes, and the tourniquet was inflated to 250 mmHg. A surgical time-out was taken to confirm patient, surgical site and procedure were verified. The incision in the distal palmar crease in line with the index finger was performed. Dissection was carried down through soft tissue and full-thickness skin flaps were raised. The flexor tendon sheath was visualized. There was swelling around this and the A1 pulley was then partially released. Pus was noted at this level as well as some pus surrounding the tendon sheath. The laceration at the base of the index finger was explored, and there was pus at this level as well. Incision in the volar crease at the DIP joint was opened and the distal end of the tendon sheath was visualized and this was then opened and pus was present here. A last incision was performed over the volar aspect of the PIP joint and once again dissection was carried down through soft tissue to the tendon sheath. A small amount of pus was noted at this level. The tendon sheath was then copiously irrigated using 3 L of normal saline. A blunt-tip needle was then threaded through the tendon sheath to irrigate the tendon sheath and care was taken to ensure that the saline fluid was run between the incisions. After 3 L of normal saline, the fluid was clear. The wounds were then packed with iodoform gauze. Sterile bulky dressings were applied. The tourniquet was released and the fingers pinked up nicely. The patient was awakened from anesthesia and was taken to the recovery in fair condition. He will be admitted for postoperative observation. Packing will be removed in 36 to 48 hours.         For questions regarding this query, please contact Clinical Documentation : Parminder Aguayo RN Cell# [phone] THIS FORM IS A PERMANENT PART OF THE MEDICAL RECORD

## 2023-09-07 NOTE — PROGRESS NOTES
Patient seen and examined. Plan for discharge home later today on once weekly IV abx. Amlodipine and norco sent to pharmacy.     Ronny Nunez, DO

## 2023-09-07 NOTE — PLAN OF CARE
Patient A&Ox4, BP elevated, asymptomatic. Room air. Dressing to R hand C/D/I. Patient reporting moderate pain to R hand, PO pain medication given with adequate relief. Patient ambulating frequently. PICC to ANDRAE, L arm precautions observed. Tolerating diet, voiding freely, LBM 9/6. Plan to DC this afternoon and receive loading dose of IV ABX at clinic.  POC discussed with patient

## 2023-09-08 ENCOUNTER — PATIENT OUTREACH (OUTPATIENT)
Dept: CASE MANAGEMENT | Age: 53
End: 2023-09-08

## 2023-09-08 NOTE — PROGRESS NOTES
LM for pt to call [facility] for TCM since discharge. NCM phone number was provided for pt to call back. TCC contact information was provided for pt to call back as well.

## 2023-09-11 NOTE — PROGRESS NOTES
Pt did not show for TCC appt at 11 AM, NCM to FU. LM for pt to call [facility] for TCM since discharge. [facility] phone number was provided for pt to call back. TCC contact information was provided for pt to call back as well.

## 2023-09-13 ENCOUNTER — PATIENT OUTREACH (OUTPATIENT)
Dept: CASE MANAGEMENT | Age: 53
End: 2023-09-13

## 2023-09-19 PROBLEM — G10 HUNTINGTON DISEASE (HCC): Status: ACTIVE | Noted: 2023-09-19

## 2025-04-11 NOTE — PLAN OF CARE
Medication refilled per protocol  Last seen: 10/24        BP Readings from Last 2 Encounters:   10/30/24 112/76   04/10/24 109/71     Pulse Readings from Last 2 Encounters:   10/30/24 74   04/10/24 63          Labs:  Hemoglobin A1C (%)   Date Value   04/15/2021 4.7     Last Point of Care A1C:  No results found for: \"EQZQUHKB8H\", \"5GLYH\"  CALCULATED LDL (mg/dL)   Date Value   10/11/2019 111     CHOLESTEROL (mg/dL)   Date Value   10/11/2019 178     HDL (mg/dL)   Date Value   10/11/2019 58     Potassium (mmol/L)   Date Value   04/10/2023 3.8     Sodium (mmol/L)   Date Value   04/10/2023 140     Glomerular Filtration Rate (no units)   Date Value   04/10/2023 83     HCT (%)   Date Value   04/10/2024 35.9 (L)     TSH (mcUnits/mL)   Date Value   12/31/2018 1.304      No results found for: \"MALBCR\"     Patient A&Ox4, VSS on RA, . Patient reports severe pain to right hand; IV pain medications given with relief. Right hand edematous and tender to touch. Patient NPO, normal saline infusing at 75 mL/hr. Updated family per patient request. Social work on consult. Plan for OR later today. Plan of care reviewed with patient. Patient demonstrates understanding.

## (undated) DEVICE — STERILE POLYISOPRENE POWDER-FREE SURGICAL GLOVES: Brand: PROTEXIS

## (undated) DEVICE — PADDING CAST SOFT ROLL 3IN

## (undated) DEVICE — SLEEVE KENDALL SCD EXPRESS MED

## (undated) DEVICE — UPPER EXTREMITY CDS-LF: Brand: MEDLINE INDUSTRIES, INC.

## (undated) DEVICE — DISPOSABLE TOURNIQUET CUFF SINGLE BLADDER, DUAL PORT AND QUICK CONNECT CONNECTOR: Brand: COLOR CUFF

## (undated) DEVICE — STERILE SYNTHETIC POLYISOPRENE POWDER-FREE SURGICAL GLOVES WITH HYDROGEL COATING: Brand: PROTEXIS

## (undated) DEVICE — GOWN,SIRUS,FABRIC-REINFORCED,X-LARGE: Brand: MEDLINE

## (undated) DEVICE — SOL NACL IRRIG 0.9% 1000ML BTL